# Patient Record
Sex: MALE | Race: WHITE | NOT HISPANIC OR LATINO | Employment: UNEMPLOYED | URBAN - METROPOLITAN AREA
[De-identification: names, ages, dates, MRNs, and addresses within clinical notes are randomized per-mention and may not be internally consistent; named-entity substitution may affect disease eponyms.]

---

## 2017-01-11 ENCOUNTER — OFFICE VISIT (OUTPATIENT)
Dept: INTERNAL MEDICINE | Facility: CLINIC | Age: 32
End: 2017-01-11

## 2017-01-11 VITALS
RESPIRATION RATE: 18 BRPM | TEMPERATURE: 98.6 F | SYSTOLIC BLOOD PRESSURE: 104 MMHG | HEART RATE: 70 BPM | BODY MASS INDEX: 23.52 KG/M2 | DIASTOLIC BLOOD PRESSURE: 70 MMHG | OXYGEN SATURATION: 97 % | WEIGHT: 157 LBS

## 2017-01-11 DIAGNOSIS — R22.0 MASS OF SUBMANDIBULAR REGION: Primary | ICD-10-CM

## 2017-01-11 DIAGNOSIS — H61.23 BILATERAL IMPACTED CERUMEN: ICD-10-CM

## 2017-01-11 PROCEDURE — 69210 REMOVE IMPACTED EAR WAX UNI: CPT | Performed by: PHYSICIAN ASSISTANT

## 2017-01-11 PROCEDURE — 99213 OFFICE O/P EST LOW 20 MIN: CPT | Performed by: PHYSICIAN ASSISTANT

## 2017-01-11 NOTE — PROGRESS NOTES
"Subjective   Steven Calle is a 31 y.o. male.   Chief Complaint   Patient presents with   • Mass     f/u     History of Present Illness   PT had mass on neck removed on 12/19/16.  Pathology came back as mass of submandibular gland.  Pt says that his ROM of his neck is not the same since the surgery.    Pt works 3rd shift and while sleeping wakes up a lot, about 15 times per night x several years.  He doesn't know if he snores.  Has excessive daytime sleepiness.  He has tried melatonin and cannot wake up after taking it.      The following portions of the patient's history were reviewed and updated as appropriate: allergies, current medications and problem list.    Review of Systems   Constitutional: Positive for fatigue. Negative for chills and fever.   HENT: Positive for hearing loss. Negative for congestion.    Eyes: Negative for visual disturbance.   Neurological: Negative for headaches.   Hematological: Negative for adenopathy. Does not bruise/bleed easily.   Psychiatric/Behavioral: Positive for sleep disturbance. The patient is nervous/anxious.        Objective   Physical Exam   Constitutional: He appears well-developed and well-nourished.   HENT:   Head: Normocephalic and atraumatic.   Right Ear: External ear normal.   Left Ear: External ear normal.   Bilateral cerumen impaction.   Eyes: Conjunctivae are normal.   Cardiovascular: Normal rate, regular rhythm and normal heart sounds.  Exam reveals no gallop and no friction rub.    No murmur heard.  Pulmonary/Chest: Effort normal and breath sounds normal.   Skin:   Pt has large scar under left jaw line that is approx 3.5\" in length.  It is clean, dry and intact.  SCM muscle is ttp.   Psychiatric: He has a normal mood and affect.   Vitals reviewed.      Ear Cerumen Removal Instrumentation  Date/Time: 1/11/2017 5:50 PM  Performed by: CHRISTA LARIOS  Authorized by: CHRISTA LARIOS  Consent: Verbal consent obtained.  Consent given by: patient  Local anesthetic: " none  Location details: left ear  Procedure type: curette and irrigation  Patient sedated: no  Patient tolerance: Patient tolerated the procedure well with no immediate complications  Comments: 90% success getting out wax.  This took many attempts with both stool softener and hydrogen peroxide.  Second ear was not attempted d/t the time involved of 1 hour.          Assessment/Plan   Steven was seen today for mass.    Diagnoses and all orders for this visit:    Mass of submandibular region    Bilateral impacted cerumen  -     Ear Cerumen Removal Instrumentation

## 2017-01-11 NOTE — MR AVS SNAPSHOT
Steven Calle   1/11/2017 3:00 PM   Office Visit    Provider:  MERRITT Armijo   Department:  [facility]   Dept Phone:  [phone]                Your Full Care Plan              Your Updated Medication List          This list is accurate as of: 1/11/17  4:32 PM.  Always use your most recent med list.                omeprazole 20 MG capsule   Commonly known as:  PRILOSEC   Take 1 capsule by mouth Daily.               You Were Diagnosed With        Codes Comments    Mass of submandibular region    -  Primary ICD-10-CM: R22.0  ICD-9-CM: 784.2       Instructions     None    Patient Instructions History      MyChart Signup     Our records indicate that you have declined [facility] sign[facility]. If you would like to sign up for [employer], please email [email or web address] or call [phone] to obtain an activation code.             Other Info from Your Visit           Allergies     No Known Allergies      Reason for Visit     Mass f/u      Vital Signs     Blood Pressure Pulse Temperature Respirations Weight Oxygen Saturation    104/70 (BP Location: Left arm, Patient Position: Sitting) 70 98.6 °F (37 °C) 18 157 lb (71.2 kg) 97%    Body Mass Index Smoking Status                23.52 kg/m2 Current Every Day Smoker          Problems and Diagnoses Noted     Mass of submandibular region

## 2017-01-11 NOTE — PROGRESS NOTES
Subjective   Steven Calle is a 31 y.o. male.   Chief Complaint   Patient presents with   • Mass     f/u     History of Present Illness     {Common H&P Review Areas:01636}    Review of Systems    Objective   Physical Exam    Assessment/Plan   Steven was seen today for mass.    Diagnoses and all orders for this visit:    Mass of submandibular region    Bilateral impacted cerumen  -     Ear Cerumen Removal Instrumentation